# Patient Record
Sex: FEMALE | Race: WHITE | URBAN - METROPOLITAN AREA
[De-identification: names, ages, dates, MRNs, and addresses within clinical notes are randomized per-mention and may not be internally consistent; named-entity substitution may affect disease eponyms.]

---

## 2023-02-20 ENCOUNTER — OFFICE (OUTPATIENT)
Facility: LOCATION | Age: 63
Setting detail: OPHTHALMOLOGY
End: 2023-02-20
Payer: COMMERCIAL

## 2023-02-20 VITALS — HEIGHT: 63 IN | BODY MASS INDEX: 42.52 KG/M2 | WEIGHT: 240 LBS

## 2023-02-20 DIAGNOSIS — Z79.899: ICD-10-CM

## 2023-02-20 DIAGNOSIS — H01.004: ICD-10-CM

## 2023-02-20 DIAGNOSIS — H04.123: ICD-10-CM

## 2023-02-20 DIAGNOSIS — H01.001: ICD-10-CM

## 2023-02-20 DIAGNOSIS — H02.89: ICD-10-CM

## 2023-02-20 PROCEDURE — 92014 COMPRE OPH EXAM EST PT 1/>: CPT | Performed by: OPHTHALMOLOGY

## 2023-02-20 PROCEDURE — 92201 OPSCPY EXTND RTA DRAW UNI/BI: CPT | Performed by: OPHTHALMOLOGY

## 2023-02-20 ASSESSMENT — CONFRONTATIONAL VISUAL FIELD TEST (CVF)
OD_FINDINGS: FULL
OS_FINDINGS: FULL

## 2023-02-20 ASSESSMENT — DRY EYES - PHYSICIAN NOTES
OS_GENERALCOMMENTS: NO STAINING
OD_GENERALCOMMENTS: NO STAINING

## 2023-02-20 ASSESSMENT — AXIALLENGTH_DERIVED
OD_AL: 24.8709
OS_AL: 23.5198
OS_AL: 23.4234
OS_AL: 23.5198
OD_AL: 24.9797

## 2023-02-20 ASSESSMENT — REFRACTION_MANIFEST
OS_CYLINDER: -0.75
OD_CYLINDER: -1.25
OS_AXIS: 050
OS_VA1: 20/30+2
OS_SPHERE: -0.25
OD_VA1: 20/25+
OS_CYLINDER: -0.25
OS_VA1: 20/20
OD_SPHERE: PLANO
OS_AXIS: 080
OS_CYLINDER: 0.00
OS_AXIS: 75
OS_SPHERE: -0.75
OD_CYLINDER: -1.25
OS_SPHERE: -0.50
OD_SPHERE: -0.25
OS_SPHERE: PLANO
OD_AXIS: 35
OU_VA: 20/30
OS_VA1: 20/25
OD_AXIS: 35
OD_VA1: 20/40

## 2023-02-20 ASSESSMENT — REFRACTION_CURRENTRX
OS_OVR_VA: 20/
OS_CYLINDER: 0.00
OD_CYLINDER: 0.00
OD_SPHERE: +1.50
OS_SPHERE: +1.50
OD_OVR_VA: 20/

## 2023-02-20 ASSESSMENT — KERATOMETRY
OD_AXISANGLE_DEGREES: 167
OD_K1POWER_DIOPTERS: 41.00
METHOD_AUTO_MANUAL: MANUAL
OD_K2POWER_DIOPTERS: 41.25
OS_K2POWER_DIOPTERS: 45.00
OS_K1POWER_DIOPTERS: 44.25
OS_AXISANGLE_DEGREES: 147

## 2023-02-20 ASSESSMENT — SPHEQUIV_DERIVED
OS_SPHEQUIV: -0.875
OS_SPHEQUIV: -0.625
OD_SPHEQUIV: -1.125
OS_SPHEQUIV: -0.875
OD_SPHEQUIV: -0.875

## 2023-02-20 ASSESSMENT — LID EXAM ASSESSMENTS
OD_BLEPHARITIS: RUL T
OS_MEIBOMITIS: 2+
OS_BLEPHARITIS: LUL T
OD_MEIBOMITIS: 2+

## 2023-02-20 ASSESSMENT — CORNEAL DYSTROPHY - POSTERIOR
OS_POSTERIORDYSTROPHY: GUTTATA
OD_POSTERIORDYSTROPHY: GUTTATA

## 2023-02-20 ASSESSMENT — VISUAL ACUITY
OS_BCVA: 20/25
OD_BCVA: 20/70 BLURRY

## 2023-02-20 ASSESSMENT — REFRACTION_AUTOREFRACTION
OS_SPHERE: -0.50
OD_CYLINDER: -0.75
OS_AXIS: 59
OD_AXIS: 57
OD_SPHERE: -0.75
OS_CYLINDER: -0.75

## 2023-03-22 ENCOUNTER — APPOINTMENT (OUTPATIENT)
Dept: GASTROENTEROLOGY | Facility: CLINIC | Age: 63
End: 2023-03-22
Payer: COMMERCIAL

## 2023-03-22 ENCOUNTER — NON-APPOINTMENT (OUTPATIENT)
Age: 63
End: 2023-03-22

## 2023-03-22 VITALS
HEIGHT: 63 IN | WEIGHT: 238 LBS | DIASTOLIC BLOOD PRESSURE: 80 MMHG | HEART RATE: 94 BPM | OXYGEN SATURATION: 96 % | SYSTOLIC BLOOD PRESSURE: 130 MMHG | BODY MASS INDEX: 42.17 KG/M2 | TEMPERATURE: 97.1 F

## 2023-03-22 DIAGNOSIS — Z00.00 ENCOUNTER FOR GENERAL ADULT MEDICAL EXAMINATION W/OUT ABNORMAL FINDINGS: ICD-10-CM

## 2023-03-22 DIAGNOSIS — Z12.11 ENCOUNTER FOR SCREENING FOR MALIGNANT NEOPLASM OF COLON: ICD-10-CM

## 2023-03-22 DIAGNOSIS — Z86.69 PERSONAL HISTORY OF OTHER DISEASES OF THE NERVOUS SYSTEM AND SENSE ORGANS: ICD-10-CM

## 2023-03-22 DIAGNOSIS — Z87.09 PERSONAL HISTORY OF OTHER DISEASES OF THE RESPIRATORY SYSTEM: ICD-10-CM

## 2023-03-22 PROCEDURE — 99203 OFFICE O/P NEW LOW 30 MIN: CPT

## 2023-03-22 RX ORDER — POLYETHYLENE GLYCOL-3350 AND ELECTROLYTES 236; 6.74; 5.86; 2.97; 22.74 G/274.31G; G/274.31G; G/274.31G; G/274.31G; G/274.31G
236 POWDER, FOR SOLUTION ORAL
Qty: 1 | Refills: 0 | Status: ACTIVE | COMMUNITY
Start: 2023-03-22 | End: 1900-01-01

## 2023-03-22 RX ORDER — BUTALB/ACETAMINOPHEN/CAFFEINE 50-325-40
TABLET ORAL
Refills: 0 | Status: ACTIVE | COMMUNITY

## 2023-03-22 RX ORDER — HYDROXYCHLOROQUINE SULFATE 200 MG/1
200 TABLET ORAL
Refills: 0 | Status: ACTIVE | COMMUNITY

## 2023-03-22 RX ORDER — FLUTICASONE PROPION/SALMETEROL 500-50 MCG
BLISTER, WITH INHALATION DEVICE INHALATION
Refills: 0 | Status: ACTIVE | COMMUNITY

## 2023-03-22 RX ORDER — PREDNISONE 10 MG/1
10 TABLET ORAL
Refills: 0 | Status: ACTIVE | COMMUNITY

## 2023-03-22 RX ORDER — CHROMIUM 200 MCG
500 TABLET ORAL
Refills: 0 | Status: ACTIVE | COMMUNITY

## 2023-03-22 RX ORDER — MONTELUKAST 10 MG/1
10 TABLET, FILM COATED ORAL
Refills: 0 | Status: ACTIVE | COMMUNITY

## 2023-03-22 RX ORDER — LORATADINE 5 MG/5 ML
SOLUTION, ORAL ORAL
Refills: 0 | Status: ACTIVE | COMMUNITY

## 2023-03-22 RX ORDER — FLUTICASONE PROPIONATE 50 UG/1
SPRAY, METERED NASAL
Refills: 0 | Status: ACTIVE | COMMUNITY

## 2023-03-22 RX ORDER — EPINEPHRINE 0.3 MG/.3ML
0.3 MG/0.3ML INJECTION INTRAMUSCULAR
Refills: 0 | Status: ACTIVE | COMMUNITY

## 2023-03-22 RX ORDER — ALBUTEROL SULFATE 4 MG
TABLET,EXTENDED RELEASE MULTIPHASE 12 HR ORAL
Refills: 0 | Status: ACTIVE | COMMUNITY

## 2023-03-22 NOTE — HISTORY OF PRESENT ILLNESS
[FreeTextEntry1] : Patient came to the office today to arrange for colonoscopy it has been about 10 years since her last examination.  The patient looks and feels well and offers no complaints.  She does have chronic pulmonary issues and was recently diagnosed with Sjogren's syndrome.  She is following with rheumatology.  She denies current nausea vomiting fever chills rectal bleeding or melena.  She has no cardiac complaints.

## 2023-03-22 NOTE — ASSESSMENT
[FreeTextEntry1] : Impression and plan\par \par Patient came to the office today to arrange for colonoscopy.  It has been several years since her last examination.  The risks benefits alternatives and limitations were discussed.  Patient does have a family history of colonic polyps.  Further suggestions will follow after above.

## 2023-05-03 ENCOUNTER — APPOINTMENT (OUTPATIENT)
Dept: GASTROENTEROLOGY | Facility: AMBULATORY MEDICAL SERVICES | Age: 63
End: 2023-05-03
Payer: COMMERCIAL

## 2023-05-03 PROCEDURE — 45378 DIAGNOSTIC COLONOSCOPY: CPT | Mod: PT

## 2023-05-03 PROCEDURE — 43235 EGD DIAGNOSTIC BRUSH WASH: CPT | Mod: 59

## 2023-05-18 ENCOUNTER — NON-APPOINTMENT (OUTPATIENT)
Age: 63
End: 2023-05-18

## 2023-09-11 ENCOUNTER — RX ONLY (RX ONLY)
Age: 63
End: 2023-09-11

## 2023-09-11 ENCOUNTER — OFFICE (OUTPATIENT)
Facility: LOCATION | Age: 63
Setting detail: OPHTHALMOLOGY
End: 2023-09-11
Payer: COMMERCIAL

## 2023-09-11 VITALS — HEIGHT: 63 IN | BODY MASS INDEX: 42.52 KG/M2 | WEIGHT: 240 LBS

## 2023-09-11 DIAGNOSIS — H04.123: ICD-10-CM

## 2023-09-11 DIAGNOSIS — H01.004: ICD-10-CM

## 2023-09-11 DIAGNOSIS — H01.001: ICD-10-CM

## 2023-09-11 DIAGNOSIS — Z79.899: ICD-10-CM

## 2023-09-11 DIAGNOSIS — H52.7: ICD-10-CM

## 2023-09-11 DIAGNOSIS — H02.89: ICD-10-CM

## 2023-09-11 DIAGNOSIS — H26.493: ICD-10-CM

## 2023-09-11 PROCEDURE — 99213 OFFICE O/P EST LOW 20 MIN: CPT | Performed by: OPHTHALMOLOGY

## 2023-09-11 ASSESSMENT — CORNEAL DYSTROPHY - POSTERIOR
OD_POSTERIORDYSTROPHY: GUTTATA
OS_POSTERIORDYSTROPHY: GUTTATA

## 2023-09-11 ASSESSMENT — DRY EYES - PHYSICIAN NOTES
OS_GENERALCOMMENTS: NO STAINING
OD_GENERALCOMMENTS: NO STAINING

## 2023-09-11 ASSESSMENT — LID EXAM ASSESSMENTS
OD_BLEPHARITIS: RUL T
OS_BLEPHARITIS: LUL T
OD_MEIBOMITIS: 2+
OS_MEIBOMITIS: 2+

## 2023-09-11 ASSESSMENT — CONFRONTATIONAL VISUAL FIELD TEST (CVF)
OS_FINDINGS: FULL
OD_FINDINGS: FULL

## 2023-09-12 ASSESSMENT — REFRACTION_MANIFEST
OS_CYLINDER: -0.75
OD_VA1: 20/40
OD_AXIS: 35
OS_AXIS: 050
OS_VA1: 20/20
OS_VA1: 20/25
OS_SPHERE: -0.50
OS_CYLINDER: 0.00
OS_AXIS: 75
OS_AXIS: 080
OD_AXIS: 35
OD_SPHERE: -0.25
OS_SPHERE: -0.75
OS_SPHERE: PLANO
OS_CYLINDER: -0.25
OD_SPHERE: PLANO
OS_VA1: 20/30+2
OD_CYLINDER: -1.25
OD_CYLINDER: -1.25
OD_VA1: 20/25+
OS_SPHERE: -0.25
OU_VA: 20/30

## 2023-09-12 ASSESSMENT — REFRACTION_CURRENTRX
OS_CYLINDER: 0.00
OD_SPHERE: +1.50
OS_SPHERE: +1.50
OD_CYLINDER: 0.00
OS_OVR_VA: 20/
OD_OVR_VA: 20/

## 2023-09-12 ASSESSMENT — KERATOMETRY
OS_K2POWER_DIOPTERS: 44.25
METHOD_AUTO_MANUAL: MANUAL
OD_K2POWER_DIOPTERS: 41.50
OS_AXISANGLE_DEGREES: 55
OD_AXISANGLE_DEGREES: 123
OS_K1POWER_DIOPTERS: 44.75
OD_K1POWER_DIOPTERS: 41.00

## 2023-09-12 ASSESSMENT — REFRACTION_AUTOREFRACTION
OS_CYLINDER: -1.00
OS_SPHERE: PLANO
OD_SPHERE: +1.00
OD_AXIS: 48
OD_CYLINDER: -1.00
OS_AXIS: 77

## 2023-09-12 ASSESSMENT — SPHEQUIV_DERIVED
OD_SPHEQUIV: 0.5
OD_SPHEQUIV: -0.875
OS_SPHEQUIV: -0.875
OS_SPHEQUIV: -0.625

## 2023-09-12 ASSESSMENT — AXIALLENGTH_DERIVED
OS_AL: 23.4687
OS_AL: 23.5655
OD_AL: 24.82
OD_AL: 24.2409

## 2023-09-12 ASSESSMENT — VISUAL ACUITY
OD_BCVA: 20/80-3
OS_BCVA: 20/30

## 2023-09-19 ENCOUNTER — APPOINTMENT (OUTPATIENT)
Dept: GASTROENTEROLOGY | Facility: CLINIC | Age: 63
End: 2023-09-19
Payer: COMMERCIAL

## 2023-09-19 VITALS
DIASTOLIC BLOOD PRESSURE: 78 MMHG | HEIGHT: 63 IN | TEMPERATURE: 97.3 F | SYSTOLIC BLOOD PRESSURE: 120 MMHG | WEIGHT: 240 LBS | BODY MASS INDEX: 42.52 KG/M2

## 2023-09-19 DIAGNOSIS — R10.9 UNSPECIFIED ABDOMINAL PAIN: ICD-10-CM

## 2023-09-19 PROCEDURE — 99213 OFFICE O/P EST LOW 20 MIN: CPT

## 2023-09-19 RX ORDER — ACETAMINOPHEN 650 MG/1
TABLET, FILM COATED, EXTENDED RELEASE ORAL
Refills: 0 | Status: ACTIVE | COMMUNITY

## 2023-09-19 RX ORDER — METRONIDAZOLE 500 MG/1
500 TABLET ORAL 3 TIMES DAILY
Qty: 21 | Refills: 0 | Status: ACTIVE | COMMUNITY
Start: 2023-09-19 | End: 1900-01-01

## 2023-09-19 RX ORDER — CYCLOSPORINE 0.5 MG/ML
EMULSION OPHTHALMIC
Refills: 0 | Status: ACTIVE | COMMUNITY

## 2023-09-19 RX ORDER — CHOLECALCIFEROL (VITAMIN D3) 50 MCG
CAPSULE ORAL
Refills: 0 | Status: ACTIVE | COMMUNITY

## 2023-09-19 RX ORDER — CIPROFLOXACIN HYDROCHLORIDE 500 MG/1
500 TABLET, FILM COATED ORAL
Qty: 14 | Refills: 1 | Status: ACTIVE | COMMUNITY
Start: 2023-09-19 | End: 1900-01-01

## 2023-09-23 PROBLEM — R10.9 ABDOMINAL CRAMPING: Status: ACTIVE | Noted: 2023-09-23

## 2023-10-19 ENCOUNTER — RX ONLY (RX ONLY)
Age: 63
End: 2023-10-19

## 2023-10-19 ENCOUNTER — OFFICE (OUTPATIENT)
Facility: LOCATION | Age: 63
Setting detail: OPHTHALMOLOGY
End: 2023-10-19
Payer: COMMERCIAL

## 2023-10-19 DIAGNOSIS — H26.492: ICD-10-CM

## 2023-10-19 PROCEDURE — 66821 AFTER CATARACT LASER SURGERY: CPT | Mod: LT | Performed by: OPHTHALMOLOGY

## 2023-10-19 ASSESSMENT — REFRACTION_MANIFEST
OD_SPHERE: -0.25
OS_AXIS: 080
OS_CYLINDER: 0.00
OD_CYLINDER: -1.25
OD_AXIS: 35
OD_VA1: 20/25+
OD_VA1: 20/40
OS_AXIS: 050
OS_SPHERE: -0.75
OD_AXIS: 35
OS_VA1: 20/30+2
OS_AXIS: 75
OS_SPHERE: PLANO
OS_SPHERE: -0.25
OS_VA1: 20/25
OS_VA1: 20/20
OD_CYLINDER: -1.25
OD_SPHERE: PLANO
OS_CYLINDER: -0.25
OU_VA: 20/30
OS_CYLINDER: -0.75
OS_SPHERE: -0.50

## 2023-10-19 ASSESSMENT — REFRACTION_AUTOREFRACTION
OS_CYLINDER: -1.00
OS_AXIS: 77
OD_CYLINDER: -1.00
OS_SPHERE: PLANO
OD_AXIS: 48
OD_SPHERE: +1.00

## 2023-10-19 ASSESSMENT — KERATOMETRY
OS_AXISANGLE_DEGREES: 55
OD_K2POWER_DIOPTERS: 41.50
OS_K1POWER_DIOPTERS: 44.75
OS_K2POWER_DIOPTERS: 44.25
METHOD_AUTO_MANUAL: MANUAL
OD_AXISANGLE_DEGREES: 123
OD_K1POWER_DIOPTERS: 41.00

## 2023-10-19 ASSESSMENT — REFRACTION_CURRENTRX
OD_CYLINDER: 0.00
OS_SPHERE: +1.50
OD_OVR_VA: 20/
OD_SPHERE: +1.50
OS_OVR_VA: 20/
OS_CYLINDER: 0.00

## 2023-10-19 ASSESSMENT — SPHEQUIV_DERIVED
OS_SPHEQUIV: -0.625
OD_SPHEQUIV: -0.875
OS_SPHEQUIV: -0.875
OD_SPHEQUIV: 0.5

## 2023-10-19 ASSESSMENT — AXIALLENGTH_DERIVED
OS_AL: 23.5655
OS_AL: 23.4687
OD_AL: 24.2409
OD_AL: 24.82

## 2023-10-19 ASSESSMENT — VISUAL ACUITY
OD_BCVA: 20/80-3
OS_BCVA: 20/30

## 2024-04-04 ENCOUNTER — RX ONLY (RX ONLY)
Age: 64
End: 2024-04-04

## 2024-04-04 ENCOUNTER — OFFICE (OUTPATIENT)
Facility: LOCATION | Age: 64
Setting detail: OPHTHALMOLOGY
End: 2024-04-04
Payer: COMMERCIAL

## 2024-04-04 DIAGNOSIS — H52.7: ICD-10-CM

## 2024-04-04 DIAGNOSIS — H26.493: ICD-10-CM

## 2024-04-04 PROBLEM — Z96.1 PSEUDOPHAKIA: Status: ACTIVE | Noted: 2024-04-04

## 2024-04-04 PROCEDURE — 99213 OFFICE O/P EST LOW 20 MIN: CPT | Performed by: OPHTHALMOLOGY

## 2024-04-04 PROCEDURE — 92015 DETERMINE REFRACTIVE STATE: CPT | Performed by: OPHTHALMOLOGY

## 2024-04-04 ASSESSMENT — LID EXAM ASSESSMENTS
OS_MEIBOMITIS: 2+
OD_BLEPHARITIS: RUL T
OD_MEIBOMITIS: 2+
OS_BLEPHARITIS: LUL T

## 2024-10-04 ENCOUNTER — OFFICE (OUTPATIENT)
Facility: LOCATION | Age: 64
Setting detail: OPHTHALMOLOGY
End: 2024-10-04
Payer: COMMERCIAL

## 2024-10-04 DIAGNOSIS — H02.89: ICD-10-CM

## 2024-10-04 DIAGNOSIS — Z79.899: ICD-10-CM

## 2024-10-04 DIAGNOSIS — H04.123: ICD-10-CM

## 2024-10-04 DIAGNOSIS — H43.812: ICD-10-CM

## 2024-10-04 DIAGNOSIS — H26.493: ICD-10-CM

## 2024-10-04 DIAGNOSIS — H01.004: ICD-10-CM

## 2024-10-04 DIAGNOSIS — H01.001: ICD-10-CM

## 2024-10-04 PROCEDURE — 92250 FUNDUS PHOTOGRAPHY W/I&R: CPT | Performed by: OPHTHALMOLOGY

## 2024-10-04 PROCEDURE — 99214 OFFICE O/P EST MOD 30 MIN: CPT | Performed by: OPHTHALMOLOGY

## 2024-10-04 ASSESSMENT — REFRACTION_MANIFEST
OD_SPHERE: -0.25
OD_VA1: 20/40
OS_SPHERE: -0.75
OS_SPHERE: PLANO
OD_VA1: 20/15
OS_SPHERE: -0.75
OS_VA1: 20/20
OU_VA: 20/30
OS_VA1: 20/30+2
OD_SPHERE: -0.25
OS_CYLINDER: -0.50
OS_VA1: 20/20
OS_CYLINDER: -0.25
OS_AXIS: 080
OS_AXIS: 75
OD_SPHERE: PLANO
OU_VA: 20/15
OD_AXIS: 35
OS_CYLINDER: -0.75
OD_CYLINDER: -1.25
OD_AXIS: 35
OD_CYLINDER: -0.75
OS_SPHERE: -0.50
OD_CYLINDER: -1.25
OD_ADD: +2.50
OD_AXIS: 055
OS_ADD: +2.50
OD_VA1: 20/25+
OS_AXIS: 050
OS_CYLINDER: 0.00
OS_VA1: 20/25
OS_SPHERE: -0.25
OS_AXIS: 060

## 2024-10-04 ASSESSMENT — KERATOMETRY
OS_K1POWER_DIOPTERS: 44.25
METHOD_AUTO_MANUAL: MANUAL
OS_K2POWER_DIOPTERS: 44.75
OS_AXISANGLE_DEGREES: 136
OD_AXISANGLE_DEGREES: 149
OD_K1POWER_DIOPTERS: 41.00
OD_K2POWER_DIOPTERS: 41.50

## 2024-10-04 ASSESSMENT — CORNEAL DYSTROPHY - POSTERIOR
OD_POSTERIORDYSTROPHY: GUTTATA
OS_POSTERIORDYSTROPHY: GUTTATA

## 2024-10-04 ASSESSMENT — CONFRONTATIONAL VISUAL FIELD TEST (CVF)
OD_FINDINGS: FULL
OS_FINDINGS: FULL

## 2024-10-04 ASSESSMENT — VISUAL ACUITY
OS_BCVA: 20/30-1
OD_BCVA: 20/30-1

## 2024-10-04 ASSESSMENT — LID EXAM ASSESSMENTS
OD_MEIBOMITIS: 2+
OS_BLEPHARITIS: LUL T
OD_BLEPHARITIS: RUL T
OS_MEIBOMITIS: 2+

## 2024-10-04 ASSESSMENT — REFRACTION_AUTOREFRACTION
OS_CYLINDER: -0.50
OS_SPHERE: -0.75
OD_CYLINDER: -1.25
OS_AXIS: 042
OD_AXIS: 049
OD_SPHERE: -0.50

## 2024-10-04 ASSESSMENT — REFRACTION_CURRENTRX
OS_CYLINDER: 0.00
OS_SPHERE: +1.50
OD_CYLINDER: 0.00
OD_OVR_VA: 20/
OD_SPHERE: +1.50
OS_OVR_VA: 20/

## 2024-10-04 ASSESSMENT — DRY EYES - PHYSICIAN NOTES
OD_GENERALCOMMENTS: NO STAINING
OS_GENERALCOMMENTS: NO STAINING

## 2025-04-07 ENCOUNTER — OFFICE (OUTPATIENT)
Facility: LOCATION | Age: 65
Setting detail: OPHTHALMOLOGY
End: 2025-04-07
Payer: COMMERCIAL

## 2025-04-07 DIAGNOSIS — Z79.899: ICD-10-CM

## 2025-04-07 DIAGNOSIS — H43.812: ICD-10-CM

## 2025-04-07 DIAGNOSIS — H01.001: ICD-10-CM

## 2025-04-07 DIAGNOSIS — H01.004: ICD-10-CM

## 2025-04-07 PROCEDURE — 92012 INTRM OPH EXAM EST PATIENT: CPT | Performed by: OPHTHALMOLOGY

## 2025-04-07 ASSESSMENT — REFRACTION_CURRENTRX
OD_AXIS: 053
OS_OVR_VA: 20/
OS_AXIS: 056
OS_VPRISM_DIRECTION: SV
OS_CYLINDER: -0.50
OD_VPRISM_DIRECTION: SV
OS_SPHERE: -0.75
OD_OVR_VA: 20/
OD_SPHERE: -0.25
OD_CYLINDER: -0.75

## 2025-04-07 ASSESSMENT — REFRACTION_MANIFEST
OS_ADD: +2.50
OD_AXIS: 055
OS_VA1: 20/20
OD_SPHERE: -0.25
OD_VA1: 20/25+
OD_AXIS: 35
OD_VA1: 20/15
OD_CYLINDER: -1.25
OD_CYLINDER: -1.25
OS_VA1: 20/30+2
OD_CYLINDER: -0.75
OD_ADD: +2.50
OS_CYLINDER: 0.00
OS_VA1: 20/25
OS_SPHERE: -0.75
OS_CYLINDER: -0.50
OU_VA: 20/15
OD_VA1: 20/40
OS_SPHERE: PLANO
OS_AXIS: 080
OS_CYLINDER: -0.25
OD_AXIS: 35
OS_SPHERE: -0.25
OD_SPHERE: PLANO
OS_SPHERE: -0.75
OS_AXIS: 060
OS_VA1: 20/20
OU_VA: 20/30
OS_SPHERE: -0.50
OS_AXIS: 050
OD_SPHERE: -0.25
OS_CYLINDER: -0.75
OS_AXIS: 75

## 2025-04-07 ASSESSMENT — KERATOMETRY
OD_K2POWER_DIOPTERS: 41.25
METHOD_AUTO_MANUAL: MANUAL
OS_K1POWER_DIOPTERS: 44.25
OS_K2POWER_DIOPTERS: 44.75
OD_AXISANGLE_DEGREES: 128
OS_AXISANGLE_DEGREES: 133
OD_K1POWER_DIOPTERS: 40.75

## 2025-04-07 ASSESSMENT — LID EXAM ASSESSMENTS
OS_MEIBOMITIS: 2+
OS_BLEPHARITIS: LUL T
OD_MEIBOMITIS: 2+
OD_BLEPHARITIS: RUL T

## 2025-04-07 ASSESSMENT — REFRACTION_AUTOREFRACTION
OS_AXIS: 078
OD_SPHERE: -0.50
OD_AXIS: 053
OS_SPHERE: -0.50
OD_CYLINDER: -1.00
OS_CYLINDER: -0.50

## 2025-04-07 ASSESSMENT — VISUAL ACUITY
OS_BCVA: 20/20
OD_BCVA: 20/25+2

## 2025-04-07 ASSESSMENT — CONFRONTATIONAL VISUAL FIELD TEST (CVF)
OS_FINDINGS: FULL
OD_FINDINGS: FULL

## 2025-04-07 ASSESSMENT — DRY EYES - PHYSICIAN NOTES
OD_GENERALCOMMENTS: NO STAINING
OS_GENERALCOMMENTS: NO STAINING

## 2025-04-07 ASSESSMENT — CORNEAL DYSTROPHY - POSTERIOR
OS_POSTERIORDYSTROPHY: GUTTATA
OD_POSTERIORDYSTROPHY: GUTTATA